# Patient Record
Sex: FEMALE | Race: WHITE | NOT HISPANIC OR LATINO | Employment: FULL TIME | URBAN - METROPOLITAN AREA
[De-identification: names, ages, dates, MRNs, and addresses within clinical notes are randomized per-mention and may not be internally consistent; named-entity substitution may affect disease eponyms.]

---

## 2017-12-08 ENCOUNTER — COMMUNICATION - HEALTHEAST (OUTPATIENT)
Dept: TELEHEALTH | Facility: CLINIC | Age: 32
End: 2017-12-08

## 2017-12-08 ENCOUNTER — OFFICE VISIT - HEALTHEAST (OUTPATIENT)
Dept: FAMILY MEDICINE | Facility: CLINIC | Age: 32
End: 2017-12-08

## 2017-12-08 DIAGNOSIS — J02.9 PHARYNGITIS: ICD-10-CM

## 2017-12-08 DIAGNOSIS — R05.9 COUGH: ICD-10-CM

## 2017-12-08 DIAGNOSIS — J22 LOWER RESP. TRACT INFECTION: ICD-10-CM

## 2017-12-08 RX ORDER — ASCORBIC ACID 500 MG
500 TABLET ORAL DAILY
Status: SHIPPED | COMMUNITY
Start: 2017-12-08

## 2017-12-08 RX ORDER — ALBUTEROL SULFATE 90 UG/1
2 AEROSOL, METERED RESPIRATORY (INHALATION) EVERY 6 HOURS PRN
Qty: 18 G | Refills: 1 | Status: SHIPPED | OUTPATIENT
Start: 2017-12-08 | End: 2023-01-09

## 2017-12-08 ASSESSMENT — MIFFLIN-ST. JEOR: SCORE: 1440.71

## 2021-05-31 VITALS — HEIGHT: 65 IN | BODY MASS INDEX: 26.99 KG/M2 | WEIGHT: 162 LBS

## 2021-06-14 NOTE — PROGRESS NOTES
ASSESSMENT & PLAN      There are no diagnoses linked to this encounter.    No Follow-up on file.           CHIEF COMPLAINT: China Barber had concerns including Cough and Sore Throat.    [place]: 1.............. had concerns including Cough and Sore Throat.  No diagnosis found.  No problem-specific Assessment & Plan notes found for this encounter.      CC:             Cough  2 weeks  Comes and goes mostly at night hacking squeaky cough some pleuritic chest pain no fevers or chills  Been exposed to coworkers  Quit smoking tobacco on 12/5/2017  Denies any sinus symptoms denies any ear pain denies any skin rash    Otherwise essentially healthy medications reconciled  SUBJECTIVE:  China Barber is a 32 y.o. female    No past medical history on file.  No past surgical history on file.  Codeine and Gluten  Current Outpatient Prescriptions   Medication Sig Dispense Refill     ascorbic acid, vitamin C, (ASCORBIC ACID WITH DEBORA HIPS) 500 MG tablet Take 500 mg by mouth daily.       ascorbic acid, vitamin C, (ASCORBIC ACID) 250 mg Chew Chew as needed.       b complex vitamins tablet Take 1 tablet by mouth daily.       DOCOSAHEXANOIC ACID/EPA (FISH OIL ORAL) Take 2 capsules by mouth daily.       GARLIC EXTRACT ORAL Take by mouth daily.       MAGNESIUM ORAL Take 1 tablet by mouth every morning.       melatonin 1 mg Tab tablet Take 1 mg by mouth at bedtime as needed.       sulfacetamide sodium 10 % Susp Apply topically every morning.  4     zinc 50 mg Tab Take by mouth at bedtime.       No current facility-administered medications for this visit.      No family history on file.  Social History     Social History     Marital status: Single     Spouse name: N/A     Number of children: N/A     Years of education: N/A     Social History Main Topics     Smoking status: Former Smoker     Quit date: 5/11/2016     Smokeless tobacco: Never Used      Comment: started smoking at age 19     Alcohol use Yes     Drug use: No     Sexual  "activity: Not Asked     Other Topics Concern     None     Social History Narrative     Patient Active Problem List   Diagnosis     Sprain                                              SOCIAL: She  reports that she quit smoking about 18 months ago. She has never used smokeless tobacco. She reports that she drinks alcohol. She reports that she does not use illicit drugs.    REVIEW OF SYSTEMS:   Family history not pertinent to chief complaint or presenting problem    Review of systems otherwise negative as requested from patient, except   Those positive ROS outlined and discussed in [place].    OBJECTIVE:  /68 (Patient Site: Left Arm, Patient Position: Sitting, Cuff Size: Adult Regular)  Pulse 68  Ht 5' 5\" (1.651 m)  Wt 162 lb (73.5 kg)  BMI 26.96 kg/m2    GENERAL:     No acute distress.   Alert and oriented X 3         Physical:    The clear  Nasal mucosa mildly congested  Oropharynx hyperemia of the soft palate rapid strep taken  No cervical or subclavicular nodes  Lungs diminished but clear  Cardiac no murmur  No skin rash  Thyroid prep nontender without nodules      ASSESSMENT & PLAN      There are no diagnoses linked to this encounter.    No Follow-up on file.       Anticipatory Guidance and Symptomatic Cares Discussed   Advised to call back directly if there are further questions, or if these symptoms fail to improve as anticipated or worsen.  Return to clinic if patient has a clinical concern that warrants an exam.        20  Min Total Time, > 50% counseling and coordination of Care    Schuyler Yadav MD  Family Medicine   [facility] [id number] [phone]  "

## 2023-01-09 ENCOUNTER — APPOINTMENT (OUTPATIENT)
Dept: GENERAL RADIOLOGY | Facility: CLINIC | Age: 38
End: 2023-01-09
Attending: EMERGENCY MEDICINE

## 2023-01-09 ENCOUNTER — HOSPITAL ENCOUNTER (EMERGENCY)
Facility: CLINIC | Age: 38
Discharge: HOME | End: 2023-01-09
Attending: EMERGENCY MEDICINE | Admitting: EMERGENCY MEDICINE

## 2023-01-09 VITALS
SYSTOLIC BLOOD PRESSURE: 132 MMHG | TEMPERATURE: 98.7 F | HEIGHT: 65 IN | DIASTOLIC BLOOD PRESSURE: 81 MMHG | OXYGEN SATURATION: 99 % | HEART RATE: 78 BPM | RESPIRATION RATE: 20 BRPM | BODY MASS INDEX: 29.99 KG/M2 | WEIGHT: 180 LBS
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
## 2023-01-09 DIAGNOSIS — S29.012A UPPER BACK STRAIN, INITIAL ENCOUNTER: ICD-10-CM

## 2023-01-09 PROCEDURE — 71046 X-RAY EXAM CHEST 2 VIEWS: CPT

## 2023-01-09 PROCEDURE — 99283 EMERGENCY DEPT VISIT LOW MDM: CPT | Mod: 25

## 2023-01-09 ASSESSMENT — ENCOUNTER SYMPTOMS
ABDOMINAL PAIN: 0
WEAKNESS: 0
COUGH: 0
BACK PAIN: 1
HEADACHES: 0
NUMBNESS: 0

## 2023-01-09 ASSESSMENT — ACTIVITIES OF DAILY LIVING (ADL): ADLS_ACUITY_SCORE: 33

## 2023-01-09 NOTE — LETTER
January 9, 2023      To Whom It May Concern:      China Barber was seen in our Emergency Department today, 01/09/23.  She should avoid lifting anything greater than 10 pounds for the next 3 days.    Sincerely,        Haroon Mason MD
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
## 2025-04-12 ENCOUNTER — HOSPITAL ENCOUNTER (EMERGENCY)
Facility: CLINIC | Age: 40
Discharge: HOME | End: 2025-04-12
Attending: EMERGENCY MEDICINE | Admitting: EMERGENCY MEDICINE
Payer: COMMERCIAL

## 2025-04-12 ENCOUNTER — OFFICE VISIT (OUTPATIENT)
Dept: URGENT CARE | Facility: URGENT CARE | Age: 40
End: 2025-04-12
Payer: COMMERCIAL

## 2025-04-12 VITALS
HEIGHT: 66 IN | WEIGHT: 174.4 LBS | RESPIRATION RATE: 18 BRPM | DIASTOLIC BLOOD PRESSURE: 80 MMHG | BODY MASS INDEX: 28.03 KG/M2 | SYSTOLIC BLOOD PRESSURE: 141 MMHG | HEART RATE: 71 BPM | TEMPERATURE: 98.2 F | OXYGEN SATURATION: 99 %

## 2025-04-12 VITALS
TEMPERATURE: 98 F | HEART RATE: 70 BPM | BODY MASS INDEX: 29.45 KG/M2 | WEIGHT: 177 LBS | DIASTOLIC BLOOD PRESSURE: 79 MMHG | RESPIRATION RATE: 18 BRPM | OXYGEN SATURATION: 100 % | SYSTOLIC BLOOD PRESSURE: 120 MMHG

## 2025-04-12 DIAGNOSIS — H10.9 CONJUNCTIVITIS OF LEFT EYE, UNSPECIFIED CONJUNCTIVITIS TYPE: ICD-10-CM

## 2025-04-12 DIAGNOSIS — H57.12 LEFT EYE PAIN: ICD-10-CM

## 2025-04-12 DIAGNOSIS — H57.9 EYE PRESSURE: Primary | ICD-10-CM

## 2025-04-12 PROCEDURE — 3074F SYST BP LT 130 MM HG: CPT | Performed by: NURSE PRACTITIONER

## 2025-04-12 PROCEDURE — 250N000009 HC RX 250: Performed by: EMERGENCY MEDICINE

## 2025-04-12 PROCEDURE — 99283 EMERGENCY DEPT VISIT LOW MDM: CPT

## 2025-04-12 PROCEDURE — 99202 OFFICE O/P NEW SF 15 MIN: CPT | Performed by: NURSE PRACTITIONER

## 2025-04-12 PROCEDURE — 3078F DIAST BP <80 MM HG: CPT | Performed by: NURSE PRACTITIONER

## 2025-04-12 RX ORDER — PROPARACAINE HYDROCHLORIDE 5 MG/ML
1 SOLUTION/ DROPS OPHTHALMIC ONCE
Status: COMPLETED | OUTPATIENT
Start: 2025-04-12 | End: 2025-04-12

## 2025-04-12 RX ORDER — ERYTHROMYCIN 5 MG/G
0.5 OINTMENT OPHTHALMIC 4 TIMES DAILY
Qty: 3.5 G | Refills: 0 | Status: SHIPPED | OUTPATIENT
Start: 2025-04-12 | End: 2025-04-17

## 2025-04-12 RX ADMIN — FLUORESCEIN SODIUM 1 STRIP: 1 STRIP OPHTHALMIC at 11:43

## 2025-04-12 RX ADMIN — PROPARACAINE HYDROCHLORIDE 1 DROP: 5 SOLUTION/ DROPS OPHTHALMIC at 11:43

## 2025-04-12 ASSESSMENT — VISUAL ACUITY
OS: 20/25
OD: 20/25

## 2025-04-12 ASSESSMENT — ACTIVITIES OF DAILY LIVING (ADL): ADLS_ACUITY_SCORE: 41

## 2025-04-12 NOTE — ED PROVIDER NOTES
"  Emergency Department Note      History of Present Illness     Chief Complaint   Eye Pain      HPI   China Barber is a 39 year old otherwise healthy female who presents to the Emergency Department for evaluation of a left eye redness and swelling. She reported that she had an irritation last night in her left eye which she thought was from a makeup she was wearing so she cleaned her face and applied warm compresses before bed. This morning she woke up with her left eye glued shut with yellow discharge coming out of her eye. She reports redness, blurring of vision and swelling as well. She states she has been having \"excruciating\" pain in and around her left eye. She also reports mild periorbital numbness. She has associated 6/10 left sided headache. She went to  this morning and was sent here for further evaluation. She did not wear contact lens last night. She denies any other physcial symptoms including no fever, cough, cold, sore throat, chest pain, shortness of breath, abdominal pain or rash.     Independent Historian   None    Review of External Notes   I reviewed the  visit note from today    Past Medical History     Medical History and Problem List   The patient denies any significant past medical history.     Medications   The patient is not currently taking any regular medications.    Physical Exam     Patient Vitals for the past 24 hrs:   BP Temp Temp src Pulse Resp SpO2 Height Weight   04/12/25 1053 (!) 147/81 98.2  F (36.8  C) Temporal 77 16 99 % 1.676 m (5' 6\") 79.1 kg (174 lb 6.4 oz)     Visual acuity  Right - 20/25  Left- 20/25  Vision with corrective glasses     Physical Exam  General: Alert, No distress. Nontoxic appearance  Head: No signs of trauma.   Mouth/Throat: Oropharynx moist.   Eyes: Conjunctivae are normal. Pupils are equal.. IOP: Right- 14 Left- 12, no fluorescein dye uptake    Neck: Normal range of motion.    CV: Appears well perfused.  Resp:No respiratory distress.   MSK: " Normal range of motion. No obvious deformity.   Neuro: The patient is alert and interactive. ASHLEY. Speech normal. GCS 15  Skin: No lesion or sign of trauma noted.   Psych: normal mood and affect. behavior is normal.      Diagnostics     Lab Results   Labs Ordered and Resulted from Time of ED Arrival to Time of ED Departure - No data to display    Imaging   No orders to display     Independent Interpretation   None    ED Course      Medications Administered   Medications   proparacaine (ALCAINE) 0.5 % ophthalmic solution 1 drop (has no administration in time range)   fluorescein (FUL-EDYTA) ophthalmic strip 1 strip (has no administration in time range)       Procedures   Procedures     Discussion of Management   None    ED Course   ED Course as of 04/12/25 1141   Sat Apr 12, 2025   1127 I obtained the history and examined the patient as above.        Additional Documentation  None    Medical Decision Making / Diagnosis     CMS Diagnoses: None    MIPS       None    [facility]     China Barber is a 39 year old female who presents for evaluation of a painful red eye.  A broad differential diagnosis was considered including bacterial conjunctivitis, viral conjunctivitis, foreign body, corneal abrasion, chemical vs allergic conjunctivitis, corneal ulcer, HSV, herpes zoster opthalmicus, endopthalmitis, orbital cellulitis, etc.    Visual acuity is normal.  Intraocular pressures are normal.  Signs and symptoms consistent with a conjunctivitis, likely bacterial. Will start antibiotics and have close follow-up of eye physician later this afternoon at the [facility] if not improved.  No red flag symptoms to suggest any of the above worrisome etiologies.          Disposition   The patient was discharged.     Diagnosis     ICD-10-CM    1. Left eye pain  H57.12       2. Conjunctivitis of left eye, unspecified conjunctivitis type  H10.9            Discharge Medications   Discharge Medication List as of 4/12/2025 11:42 AM        START  taking these medications    Details   erythromycin (ROMYCIN) 5 MG/GM ophthalmic ointment Place 0.5 inches into both eyes 4 times daily for 5 days.Disp-3.5 g, [id number]             Scribe Disclosure:  Nataliia MCCONNELL, am serving as a scribe at 11:06 AM on 4/12/2025 to document services personally performed by Magdi Mcgill MD based on my observations and the provider's statements to me.        Magdi Mcgill MD  04/12/25 [id number]

## 2025-04-12 NOTE — ED TRIAGE NOTES
"Pt presents from  for eye pressure exam. Since last night pt has had redness of the left eye, swelling of the lids, and blurry vision. Pt said its extremely painful to the touch. Denies trauma to the eye, does not wear contacts. Pt also states when she woke up this morning it was \"glued shut\".  states it is not pink eye.         "

## 2025-04-12 NOTE — PROGRESS NOTES
Assessment & Plan   Problem List Items Addressed This Visit    None  Visit Diagnoses       Eye pressure    -  Primary        Proparacaine followed by fluorescein applied to the left eye abrasion no foreign object patient reports ongoing left eye pressure.  Advised patient to be seen by ophthalmology she is provided local ophthalmologist contact information and indicates she will go directly there from urgent care for further evaluation and management.         Nicotine/Tobacco Cessation  She reports that she has been smoking cigarettes. She has never used smokeless tobacco.  Nicotine/Tobacco Cessation Plan            No follow-ups on file.      Irina Atkinson is a 39 year old, presenting for the following health issues:  Urgent Care, Conjunctivitis (Pt presents swelling and redness in L eye, painful to the touch, onset since yesterday evening. ), and Sinus Problem (Pt also here with a lot of pressure in head, slight nasal congestion. )      4/12/2025     9:49 AM   Additional Questions   Roomed by Susi Auguste   Accompanied by none     HPI                Review of Systems  Constitutional, HEENT, cardiovascular, pulmonary, GI, , musculoskeletal, neuro, skin, endocrine and psych systems are negative, except as otherwise noted.      Objective    /79   Pulse 70   Temp 98  F (36.7  C) (Tympanic)   Resp 18   Wt 80.3 kg (177 lb)   SpO2 100%   BMI 29.45 kg/m    Body mass index is 29.45 kg/m .  Physical Exam   GENERAL: alert and no distress  EYES: PERRL, EOMI, and left upper and lower eyelid are edematous sclera of the left eye is erythematous proparacaine is applied to the left eye patient notes no improvement describing again excess pressure in the left eye fluorescein is subsequently utilized with black light no foreign object or abrasion is noted  RESP: respirations regular nonlabored  PSYCH: mentation appears normal, affect normal/bright            Signed Electronically by: Margot Hutson, MARSHA

## 2025-04-12 NOTE — PROGRESS NOTES
Urgent Care Clinic Visit    Chief Complaint   Patient presents with    Urgent Care    Conjunctivitis     Pt presents swelling and redness in L eye, painful to the touch, onset since yesterday evening.     Sinus Problem     Pt also here with a lot of pressure in head, slight nasal congestion.                4/12/2025     9:49 AM   Additional Questions   Roomed by Susi Auguste   Accompanied by none

## 2025-04-13 ENCOUNTER — HOSPITAL ENCOUNTER (EMERGENCY)
Facility: CLINIC | Age: 40
Discharge: HOME | End: 2025-04-13
Payer: COMMERCIAL

## 2025-04-13 VITALS
TEMPERATURE: 98.3 F | RESPIRATION RATE: 20 BRPM | SYSTOLIC BLOOD PRESSURE: 116 MMHG | WEIGHT: 175.7 LBS | OXYGEN SATURATION: 100 % | HEART RATE: 66 BPM | HEIGHT: 65 IN | BODY MASS INDEX: 29.27 KG/M2 | DIASTOLIC BLOOD PRESSURE: 85 MMHG

## 2025-04-13 DIAGNOSIS — B30.9 VIRAL CONJUNCTIVITIS: ICD-10-CM

## 2025-04-13 RX ORDER — PROPARACAINE HYDROCHLORIDE 5 MG/ML
2 SOLUTION/ DROPS OPHTHALMIC ONCE
Status: DISCONTINUED | OUTPATIENT
Start: 2025-04-13 | End: 2025-04-13

## 2025-04-13 ASSESSMENT — COLUMBIA-SUICIDE SEVERITY RATING SCALE - C-SSRS
6. HAVE YOU EVER DONE ANYTHING, STARTED TO DO ANYTHING, OR PREPARED TO DO ANYTHING TO END YOUR LIFE?: YES
2. HAVE YOU ACTUALLY HAD ANY THOUGHTS OF KILLING YOURSELF IN THE PAST MONTH?: NO
1. IN THE PAST MONTH, HAVE YOU WISHED YOU WERE DEAD OR WISHED YOU COULD GO TO SLEEP AND NOT WAKE UP?: NO

## 2025-04-13 ASSESSMENT — ACTIVITIES OF DAILY LIVING (ADL): ADLS_ACUITY_SCORE: 41

## 2025-04-13 NOTE — ED TRIAGE NOTES
Left redness, swelling, purulent drainage since Friday. Seen in urgent care and recommended to go to ER at [facility]. Diagnosed pink eye. However, pressure behind eye worsening, headache described as tightness, and blurred vision noted this morning. Unable to get in with eye specialist.      Triage Assessment (Adult)       Row Name 04/13/25 1405          Triage Assessment    Airway WDL WDL        Respiratory WDL    Respiratory WDL WDL        Skin Circulation/Temperature WDL    Skin Circulation/Temperature WDL WDL        Cardiac WDL    Cardiac WDL WDL        Peripheral/Neurovascular WDL    Peripheral Neurovascular WDL WDL        Cognitive/Neuro/Behavioral WDL    Cognitive/Neuro/Behavioral WDL WDL

## 2025-04-13 NOTE — ED PROVIDER NOTES
ED Provider Note  [facility]      History     Chief Complaint   Patient presents with    Eye Drainage     Left redness, swelling, purulent drainage since Friday. Seen in urgent care and recommended to go to ER at [facility]. Diagnosed pink eye. However, pressure behind eye worsening, headache described as tightness, and blurred vision noted this morning. Unable to get in with eye specialist.      The history is provided by the patient and medical records. No  was used.     China Barber is a 39 year old female who ***    Per chart review, patient was seen at an urgent care and [facility] ED yesterday for the same complaint.  She was diagnosed with bacterial conjunctivitis and prescribed erythromycin ointment.  She was recommended to seek further evaluation at the [facility] ED for ophthalmology consult if her symptoms did not improve or showed any worsening.  Per ED provider note, fluorescein exam negative for any uptake or signs of ulceration or abrasion and intraocular pressure was 14 on the right and 12 on the left.    Past Medical History  History reviewed. No pertinent past medical history.  History reviewed. No pertinent surgical history.  erythromycin (ROMYCIN) 5 MG/GM ophthalmic ointment  ascorbic acid, vitamin C, (ASCORBIC ACID WITH DEBORA HIPS) 500 MG tablet  ascorbic acid, vitamin C, (ASCORBIC ACID) 250 mg Chew  b complex vitamins tablet  DOCOSAHEXANOIC ACID/EPA (FISH OIL ORAL)  GARLIC EXTRACT ORAL  MAGNESIUM ORAL  melatonin 1 mg Tab tablet  sulfacetamide sodium 10 % Susp  zinc 50 mg Tab      Allergies   Allergen Reactions    Codeine Nausea and Vomiting    Gluten [Gluten Meal] Unknown     Family History  History reviewed. No pertinent family history.  Social History   Social History     Tobacco Use    Smoking status: Every Day     Current packs/day: 0.25     Types: Cigarettes    Smokeless tobacco: Never    Tobacco comments:     started smoking at age 19  "  Substance Use Topics    Alcohol use: Yes     Comment: 3 times a week, wine    Drug use: No      Past medical history, past surgical history, medications, allergies, family history, and social history were reviewed with the patient. No additional pertinent items.     A medically appropriate review of systems was performed with pertinent positives and negatives noted in the HPI, and all other systems negative.    Physical Exam   BP: 116/85  Pulse: 66  Temp: 98.3  F (36.8  C)  Resp: 20  Height: 165.1 cm (5' 5\")  Weight: 79.7 kg (175 lb 11.2 oz)  SpO2: 100 %  Physical Exam  ***    ED Course, Procedures, & Data      Procedures       {ED Course Selections (Optional):030040}  {ED Sepsis CMS Documentation (Optional):444528::\" \"}     No results found for any visits on 04/13/25.  Medications - No data to display    Labs Ordered and Resulted from Time of ED Arrival to Time of ED Departure - No data to display  No orders to display          Critical care was not performed.     Medical Decision Making  The patient's presentation was of high complexity (an acute health issue posing potential threat to life or bodily function).    The patient's evaluation involved:  review of external note(s) from 2 sources (UC and ED encounters 04/12/25)  review of 1 test result(s) ordered prior to this encounter (tonometry results, fluorescein exam results 04/12/25)  ordering and/or review of 1 test(s) in this encounter (see separate area of note for details)    The patient's management necessitated only low risk treatment.    Assessment & Plan    ***    I have reviewed the nursing notes. I have reviewed the findings, diagnosis, plan and need for follow up with the patient.    New Prescriptions    No medications on file       Final diagnoses:   Viral conjunctivitis       Shamika Morel PA-C    [facility] EMERGENCY DEPARTMENT  4/13/2025  " reactive to light. Pupils are equal.   Cardiovascular:      Rate and Rhythm: Normal rate and regular rhythm.   Pulmonary:      Effort: Pulmonary effort is normal. No respiratory distress.      Breath sounds: Normal breath sounds.   Musculoskeletal:         General: Normal range of motion.   Lymphadenopathy:      Cervical: No cervical adenopathy.   Skin:     General: Skin is warm.   Neurological:      Mental Status: She is alert. Mental status is at baseline.   Psychiatric:         Mood and Affect: Mood normal.         Behavior: Behavior normal.           ED Course, Procedures, & Data      Procedures              No results found for any visits on 04/13/25.  Medications - No data to display    Labs Ordered and Resulted from Time of ED Arrival to Time of ED Departure - No data to display  No orders to display          Critical care was not performed.     Medical Decision Making  The patient's presentation was of high complexity (an acute health issue posing potential threat to life or bodily function).    The patient's evaluation involved:  review of external note(s) from 2 sources (UC and ED encounters 04/12/25)  review of 1 test result(s) ordered prior to this encounter (tonometry results, fluorescein exam results 04/12/25)  ordering and/or review of 1 test(s) in this encounter (see separate area of note for details)    The patient's management necessitated only low risk treatment.    Assessment & Plan    China is a 39-year-old female that presented to the ED with continued concerns and worsening symptoms of right thigh pain and redness.  Acceptable vital signs on presentation without fever or tachycardia.  Patient in no acute distress and nontoxic-appearing.  Near vision exam equal bilaterally on individual exam of both eyes.  No copious purulent drainage from the right eye indicating a bacterial conjunctivitis.  No foreign body sensation or contact lens use so no fluorescein exam was performed.  Suspect viral  conjunctivitis.  Long discussion had with the patient about continuing the erythromycin ointment and recommended outpatient follow-up with optometry and/or primary care office.  Advised lubricating eyedrops and good hand hygiene as to not transfer viral infection to her left eye or to other household members.  Strict return precautions discussed at length.  Patient was agreeable to the discharge treatment plan, voiced understanding, and all questions answered prior to discharge.    I have reviewed the nursing notes. I have reviewed the findings, diagnosis, plan and need for follow up with the patient.    New Prescriptions    No medications on file       Final diagnoses:   Viral conjunctivitis       Shamika Morel PA-C    [facility] EMERGENCY DEPARTMENT  4/13/2025     Shamika Morel PA-C  05/06/25 1456

## 2025-04-13 NOTE — DISCHARGE INSTRUCTIONS
Begin pataday if you began having itchiness to your affected eye  Begin preservative-free artificial tears to help with dryness and irritation  You may continue to utilize your erythromycin ointment if you would like to but this is not a bacterial conjunctivitis so you would be using it mainly for the moisturizing factor of the ointment  You are contagious as long as your eye is inflamed and red so make sure to utilize good handwashing with soap and water as to decrease spread to other people and to your unaffected eye  Follow-up with an eye clinic as needed for reevaluation and recheck of your symptoms especially if you do not have any improvement in the next 2 weeks  Otherwise, do not hesitate to seek urgent or emergent reevaluation for any worsening or concerning signs or symptoms